# Patient Record
Sex: FEMALE | Race: WHITE
[De-identification: names, ages, dates, MRNs, and addresses within clinical notes are randomized per-mention and may not be internally consistent; named-entity substitution may affect disease eponyms.]

---

## 2018-04-12 ENCOUNTER — HOSPITAL ENCOUNTER (OUTPATIENT)
Dept: HOSPITAL 95 - PLD | Age: 73
End: 2018-04-12
Attending: DERMATOLOGY
Payer: COMMERCIAL

## 2018-04-12 DIAGNOSIS — L72.0: Primary | ICD-10-CM

## 2021-07-01 NOTE — NUR
07/01/21 0940 Samantha Dukes
0925 IV SITE IN RIGHT HAND INFILTRATED, NEW IV SITE PLACED IN LEFT
HAND.

## 2022-09-30 NOTE — NUR
09/30/22 Cathi Anderson
BUPIVACAINE 0.25% MIXED 1:1 W/ BUPIVACAINE 0.75% TO MAKE 30 MLS
BUPIVACAINE 0.5%. BUPIVACAINE 0.5% 30 MLS MIXED W/ EPI 0.15 (1MG/ML)
PER ORDER TO MAKE BUPIVACAINE 0.5% 1:200,000 FOR INJECTION AT OPSITE.
ALL 30 MLS INJECTED.